# Patient Record
Sex: MALE | Race: WHITE | NOT HISPANIC OR LATINO | Employment: FULL TIME | URBAN - METROPOLITAN AREA
[De-identification: names, ages, dates, MRNs, and addresses within clinical notes are randomized per-mention and may not be internally consistent; named-entity substitution may affect disease eponyms.]

---
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
## 2023-02-03 ENCOUNTER — OFFICE VISIT (OUTPATIENT)
Dept: MEDICAL GROUP | Facility: MEDICAL CENTER | Age: 18
End: 2023-02-03
Payer: COMMERCIAL

## 2023-02-03 ENCOUNTER — HOSPITAL ENCOUNTER (OUTPATIENT)
Facility: MEDICAL CENTER | Age: 18
End: 2023-02-03
Attending: STUDENT IN AN ORGANIZED HEALTH CARE EDUCATION/TRAINING PROGRAM
Payer: COMMERCIAL

## 2023-02-03 VITALS
DIASTOLIC BLOOD PRESSURE: 64 MMHG | BODY MASS INDEX: 25.74 KG/M2 | SYSTOLIC BLOOD PRESSURE: 110 MMHG | HEART RATE: 92 BPM | HEIGHT: 67 IN | OXYGEN SATURATION: 98 % | TEMPERATURE: 98.6 F | WEIGHT: 164 LBS

## 2023-02-03 DIAGNOSIS — F17.290 VAPING NICOTINE DEPENDENCE, TOBACCO PRODUCT: ICD-10-CM

## 2023-02-03 DIAGNOSIS — Z00.00 HEALTH CARE MAINTENANCE: ICD-10-CM

## 2023-02-03 DIAGNOSIS — J02.9 SORE THROAT: ICD-10-CM

## 2023-02-03 DIAGNOSIS — D69.3 IDIOPATHIC THROMBOCYTOPENIC PURPURA (ITP) (HCC): Chronic | ICD-10-CM

## 2023-02-03 PROBLEM — M25.512 LEFT SHOULDER PAIN: Status: RESOLVED | Noted: 2020-02-19 | Resolved: 2023-02-03

## 2023-02-03 LAB
INT CON NEG: NORMAL
INT CON POS: NORMAL
S PYO AG THROAT QL: NORMAL

## 2023-02-03 PROCEDURE — 87880 STREP A ASSAY W/OPTIC: CPT | Performed by: STUDENT IN AN ORGANIZED HEALTH CARE EDUCATION/TRAINING PROGRAM

## 2023-02-03 PROCEDURE — 87070 CULTURE OTHR SPECIMN AEROBIC: CPT

## 2023-02-03 PROCEDURE — 99204 OFFICE O/P NEW MOD 45 MIN: CPT | Mod: 25 | Performed by: STUDENT IN AN ORGANIZED HEALTH CARE EDUCATION/TRAINING PROGRAM

## 2023-02-03 PROCEDURE — 99406 BEHAV CHNG SMOKING 3-10 MIN: CPT | Mod: 25 | Performed by: STUDENT IN AN ORGANIZED HEALTH CARE EDUCATION/TRAINING PROGRAM

## 2023-02-03 RX ORDER — FLUTICASONE PROPIONATE 50 MCG
1 SPRAY, SUSPENSION (ML) NASAL 2 TIMES DAILY
Qty: 16 G | Refills: 0 | Status: SHIPPED | OUTPATIENT
Start: 2023-02-03 | End: 2023-12-04

## 2023-02-03 RX ORDER — PANTOPRAZOLE SODIUM 40 MG/1
40 TABLET, DELAYED RELEASE ORAL DAILY
Qty: 14 TABLET | Refills: 0 | Status: SHIPPED | OUTPATIENT
Start: 2023-02-03 | End: 2023-02-17

## 2023-02-03 ASSESSMENT — ENCOUNTER SYMPTOMS
FEVER: 0
SHORTNESS OF BREATH: 0
CHILLS: 0
SORE THROAT: 1

## 2023-02-03 NOTE — PROGRESS NOTES
Subjective:     CC:  Diagnoses of Idiopathic thrombocytopenic purpura (ITP) (HCC), Vaping nicotine dependence, tobacco product, Sore throat, and Health care maintenance were pertinent to this visit.    HISTORY OF THE PRESENT ILLNESS: Patient is a 17 y.o. male with the following medical problems   Past Medical History:   Diagnosis Date    ITP (idiopathic thrombocytopenic purpura) 2005     . This pleasant patient is here today to establish care and discuss the following;    Problem   Vaping Nicotine Dependence, Tobacco Product    He started vaping in 2020, and currently vapes everyday     Sore Throat    Patient was diagnosed with strep near the end of December he was given amoxacillin. He reports he did not have any improvement in his symptoms. He denies have any other sick contacts. He is currently has productive clear cough. His cough is worse in the morning after waking up. He has been clearing his throat a lot more recently.      Idiopathic Thrombocytopenic Purpura (Itp) (Hcc)    Patient was diagnosed with ITP as an infant. He also had a platelet transfusion as an infant, he has not required any further platelet transfusion.     Left Shoulder Pain (Resolved)       Current Outpatient Medications Ordered in Epic   Medication Sig Dispense Refill    pantoprazole (PROTONIX) 40 MG Tablet Delayed Response Take 1 Tablet by mouth every day for 14 days. 14 Tablet 0    fluticasone (FLONASE) 50 MCG/ACT nasal spray Administer 1 Spray into affected nostril(S) 2 times a day. 16 g 0     No current Epic-ordered facility-administered medications on file.         ROS:   Review of Systems   Constitutional:  Negative for chills and fever.   HENT:  Positive for congestion and sore throat.    Respiratory:  Negative for shortness of breath.    Cardiovascular:  Negative for chest pain.       Objective:     Exam: /64 (BP Location: Left arm, Patient Position: Sitting, BP Cuff Size: Adult)   Pulse 92   Temp 37 °C (98.6 °F) (Temporal)  "  Ht 1.702 m (5' 7\")   Wt 74.4 kg (164 lb)   SpO2 98%  Body mass index is 25.69 kg/m².    Physical Exam  Constitutional:       General: He is not in acute distress.     Appearance: He is not ill-appearing.   HENT:      Mouth/Throat:      Pharynx: No oropharyngeal exudate or posterior oropharyngeal erythema.   Pulmonary:      Effort: Pulmonary effort is normal.   Neurological:      Mental Status: He is alert.   Psychiatric:         Mood and Affect: Mood normal.         Behavior: Behavior normal.         Thought Content: Thought content normal.         Judgment: Judgment normal.             Assessment & Plan:     Problem List Items Addressed This Visit       Idiopathic thrombocytopenic purpura (ITP) (HCC) (Chronic)    Sore throat     Acute  -I believe patient's sore throat is caused from post nasal drip and GERD   -will try flonase an claritin   -protonix 40mg for possible GERD          Relevant Medications    pantoprazole (PROTONIX) 40 MG Tablet Delayed Response    fluticasone (FLONASE) 50 MCG/ACT nasal spray    Other Relevant Orders    POCT Rapid Strep A    CULTURE THROAT    Vaping nicotine dependence, tobacco product     Chronic  -4 minutes to discuss benefits of quitting vaping          Other Visit Diagnoses       Health care maintenance        Relevant Orders    HEMOGLOBIN A1C    CBC WITH DIFFERENTIAL    Comp Metabolic Panel    TSH WITH REFLEX TO FT4    Lipid Profile                Return in about 4 weeks (around 3/3/2023) for Annual.    Please note that this dictation was created using voice recognition software. I have made every reasonable attempt to correct obvious errors, but I expect that there are errors of grammar and possibly content that I did not discover before finalizing the note.  "

## 2023-02-03 NOTE — ASSESSMENT & PLAN NOTE
Acute  -I believe patient's sore throat is caused from post nasal drip and GERD   -will try flonase an claritin   -protonix 40mg for possible GERD

## 2023-02-06 LAB
BACTERIA SPEC RESP CULT: NORMAL
SIGNIFICANT IND 70042: NORMAL
SITE SITE: NORMAL
SOURCE SOURCE: NORMAL

## 2023-02-07 ENCOUNTER — TELEPHONE (OUTPATIENT)
Dept: MEDICAL GROUP | Facility: MEDICAL CENTER | Age: 18
End: 2023-02-07
Payer: COMMERCIAL

## 2023-02-07 NOTE — TELEPHONE ENCOUNTER
----- Message from Young Gandara D.O. sent at 2/6/2023  7:56 AM PST -----  Please let patient know the throat culture was negative.

## 2023-10-12 ENCOUNTER — HOSPITAL ENCOUNTER (EMERGENCY)
Facility: MEDICAL CENTER | Age: 18
End: 2023-10-12
Attending: EMERGENCY MEDICINE
Payer: COMMERCIAL

## 2023-10-12 ENCOUNTER — APPOINTMENT (OUTPATIENT)
Dept: RADIOLOGY | Facility: MEDICAL CENTER | Age: 18
End: 2023-10-12
Attending: EMERGENCY MEDICINE
Payer: COMMERCIAL

## 2023-10-12 VITALS
DIASTOLIC BLOOD PRESSURE: 69 MMHG | RESPIRATION RATE: 16 BRPM | HEIGHT: 67 IN | WEIGHT: 199.74 LBS | OXYGEN SATURATION: 98 % | SYSTOLIC BLOOD PRESSURE: 148 MMHG | TEMPERATURE: 98.8 F | HEART RATE: 80 BPM | BODY MASS INDEX: 31.35 KG/M2

## 2023-10-12 DIAGNOSIS — M25.512 ACUTE PAIN OF LEFT SHOULDER: ICD-10-CM

## 2023-10-12 PROCEDURE — 73030 X-RAY EXAM OF SHOULDER: CPT | Mod: LT

## 2023-10-12 PROCEDURE — 99283 EMERGENCY DEPT VISIT LOW MDM: CPT

## 2023-10-12 NOTE — ED NOTES
Universal Shoulder Immobilizer applied to patient left should. Education given on how to apply immobilizer. Patient acknowledged education. CMS checked before and after immobilizer applied. CMS intact.

## 2023-10-12 NOTE — ED NOTES
Discharge education provided. Discharge paperwork signed by pt. All questions answered. All belongings with pt. Pt ambulated to lobby unassisted with steady gait.

## 2023-10-12 NOTE — ED PROVIDER NOTES
ED Provider Note    Primary care provider: Young Gandara D.O.  Means of arrival: private vehicle  History obtained from: patient  History limited by: none    CHIEF COMPLAINT  Chief Complaint   Patient presents with    Shoulder Pain     H/o bilateral shoulder repair, left shoulder has been popping out frequently, shoulder came out this morning and friend placed it back in and now having numbess to left hand with pulling into  left pectoral muscle.         HPI/ROS  Samir Carrillo is a 18 y.o. male who presents to the Emergency Department for evaluation of shoulder pain.  Patient reports that he has chronic bilateral shoulder instability.  He has had procedures done on both shoulders in 2020.  Has not followed up with orthopedics.  Over the last year he has had worsening instability in his left shoulder and reports that he subluxed/dislocates it at least 4 times a month.  He could not recall who his orthopedic surgeon was and therefore has not followed up.  This morning his shoulder was dislocated and he reports he was able to pop it back in place.  He has had paresthesias in his left arm since then but denies complete numbness or weakness.  He reports that his pain is tolerable in his left shoulder.  He describes it as a pulling pain into his left pectoral muscle.    EXTERNAL RECORDS REVIEWED  Patient last had orthopedic surgery on the left shoulder with Dr. Carvalho 2020.    LIMITATION TO HISTORY   none    OUTSIDE HISTORIAN(S):  none      PAST MEDICAL HISTORY   has a past medical history of ITP (idiopathic thrombocytopenic purpura) (2005).    SURGICAL HISTORY   has a past surgical history that includes shoulder arthroscopy (Left, 2/19/2020); shoulder repair bankhart (Left, 2/19/2020); shoulder arthroscopy (Right, 11/30/2020); and shoulder repair bankhart (Right, 11/30/2020).    SOCIAL HISTORY  Social History     Tobacco Use    Smoking status: Never    Smokeless tobacco: Never   Vaping Use    Vaping Use:  "Every day    Substances: Nicotine   Substance Use Topics    Alcohol use: Never    Drug use: Never      Social History     Substance and Sexual Activity   Drug Use Never       FAMILY HISTORY  Family History   Problem Relation Age of Onset    Hypertension Brother        CURRENT MEDICATIONS  Home Medications       Reviewed by Vanesa Rosas R.N. (Registered Nurse) on 10/12/23 at 0946  Med List Status: Not Addressed     Medication Last Dose Status   fluticasone (FLONASE) 50 MCG/ACT nasal spray  Active                    ALLERGIES  Allergies   Allergen Reactions    Percocet [Kdc:Acetaminophen+Oxycodone+Tartrazine]        PHYSICAL EXAM  VITAL SIGNS: BP (!) 156/71   Pulse 78   Temp 36.8 °C (98.2 °F) (Temporal)   Resp 16   Ht 1.702 m (5' 7\")   Wt 90.6 kg (199 lb 11.8 oz)   SpO2 97%   BMI 31.28 kg/m²   Vitals reviewed by myself.  Physical Exam  Nursing note and vitals reviewed.  Constitutional: Well-developed and well-nourished.  Appears uncomfortable  HENT: Head is normocephalic and atraumatic.  Eyes: extra-ocular movements intact  Cardiovascular: regular rate and  regular rhythm. 2+ bilateral radial pulses  Pulmonary/Chest: Normal respiratory effort  Musculoskeletal: Patient has 5 out of 5  strength in bilateral upper extremities.  Normal range of motion of bilateral elbows, patient is unable to abduct his left shoulder or flex his left shoulder past 90 degrees  Neurological: Awake and alert, sensation intact to median, ulnar, radial and axillary nerve distributions bilaterally  Skin: Skin is warm and dry. No rash.         DIAGNOSTIC STUDIES:      RADIOLOGY  Images independently interpreted by myself prior to radiologist review:  -No acute bony fracture    Final interpretation by radiology demonstrates:    DX-SHOULDER 2+ LEFT   Final Result      No acute osseous abnormality.        The radiologist's interpretation of all radiological studies have been reviewed by me.      COURSE & MEDICAL DECISION " MAKING    ED Observation Status? No; Patient does not meet criteria for ED Observation.     INITIAL ASSESSMENT AND PLAN    Patient is a 18-year-old male who comes in for evaluation of left shoulder pain and paresthesias.  Differential diagnosis includes radiculopathy, loose ligaments, subluxated shoulder, fracture, dislocation.  Diagnostic work-up includes left shoulder x-ray.     ER COURSE AND FINAL DISPOSITION   Patient's initial vitals notable for slight hypertension likely related to discomfort however he is neurovascularly intact on exam.  Patient is offered pain medication but declines at this time.  X-ray returns and demonstrates no acute osseous abnormalities.  Given patient's shoulder instability I will place him in a shoulder immobilizer and placed orthopedics referral for follow-up as he may require repeat procedure.  Patient is amenable to this plan.  He is then given strict return precautions and discharged in stable condition.    ADDITIONAL PROBLEM LIST AND RESOURCE UTILIZATION    Additional problems aside from the chief complaint that I have addressed: none    I have discussed management of the patient with the following physicians and RONEL's: none    Discussion of management with other QHP or appropriate source(s): none     Escalation of care considered, and ultimately not performed: see above.     Barriers to care at this time, including but not limited to: none.     Decision tools and prescription drugs considered including, but not limited to: see above.    Please see review of records as noted above      FINAL IMPRESSION  1. Acute pain of left shoulder

## 2023-10-12 NOTE — ED TRIAGE NOTES
Pt ambulated to triage with   Chief Complaint   Patient presents with    Shoulder Pain     H/o bilateral shoulder repair, left shoulder has been popping out frequently, shoulder came out this morning and friend placed it back in and now having numbess to left hand with pulling into  left pectoral muscle.        Pt Informed regarding triage process and verbalized understanding to inform triage tech or RN for any changes in condition. Placed in lobby.

## 2023-11-24 ENCOUNTER — HOSPITAL ENCOUNTER (EMERGENCY)
Facility: MEDICAL CENTER | Age: 18
End: 2023-11-24
Attending: EMERGENCY MEDICINE
Payer: COMMERCIAL

## 2023-11-24 VITALS
SYSTOLIC BLOOD PRESSURE: 121 MMHG | DIASTOLIC BLOOD PRESSURE: 75 MMHG | TEMPERATURE: 97.3 F | BODY MASS INDEX: 30.29 KG/M2 | WEIGHT: 188.49 LBS | OXYGEN SATURATION: 95 % | RESPIRATION RATE: 20 BRPM | HEART RATE: 75 BPM | HEIGHT: 66 IN

## 2023-11-24 DIAGNOSIS — M25.512 ACUTE PAIN OF LEFT SHOULDER: ICD-10-CM

## 2023-11-24 PROCEDURE — 700111 HCHG RX REV CODE 636 W/ 250 OVERRIDE (IP): Mod: JZ | Performed by: EMERGENCY MEDICINE

## 2023-11-24 PROCEDURE — 99283 EMERGENCY DEPT VISIT LOW MDM: CPT

## 2023-11-24 PROCEDURE — 96372 THER/PROPH/DIAG INJ SC/IM: CPT

## 2023-11-24 RX ORDER — HALOPERIDOL 5 MG/ML
2.5 INJECTION INTRAMUSCULAR ONCE
Status: COMPLETED | OUTPATIENT
Start: 2023-11-24 | End: 2023-11-24

## 2023-11-24 RX ORDER — MORPHINE SULFATE 4 MG/ML
4 INJECTION INTRAVENOUS ONCE
Status: COMPLETED | OUTPATIENT
Start: 2023-11-24 | End: 2023-11-24

## 2023-11-24 RX ADMIN — MORPHINE SULFATE 4 MG: 4 INJECTION, SOLUTION INTRAMUSCULAR; INTRAVENOUS at 19:53

## 2023-11-24 RX ADMIN — HALOPERIDOL LACTATE 2.5 MG: 5 INJECTION, SOLUTION INTRAMUSCULAR at 20:56

## 2023-11-24 ASSESSMENT — PAIN DESCRIPTION - PAIN TYPE: TYPE: ACUTE PAIN

## 2023-11-25 ENCOUNTER — OFFICE VISIT (OUTPATIENT)
Dept: URGENT CARE | Facility: PHYSICIAN GROUP | Age: 18
End: 2023-11-25
Payer: COMMERCIAL

## 2023-11-25 VITALS
SYSTOLIC BLOOD PRESSURE: 110 MMHG | BODY MASS INDEX: 29.51 KG/M2 | OXYGEN SATURATION: 97 % | TEMPERATURE: 97.8 F | HEIGHT: 67 IN | WEIGHT: 188 LBS | RESPIRATION RATE: 16 BRPM | DIASTOLIC BLOOD PRESSURE: 72 MMHG | HEART RATE: 74 BPM

## 2023-11-25 DIAGNOSIS — S46.912A SHOULDER STRAIN, LEFT, INITIAL ENCOUNTER: ICD-10-CM

## 2023-11-25 DIAGNOSIS — G47.01 INSOMNIA DUE TO MEDICAL CONDITION: ICD-10-CM

## 2023-11-25 PROCEDURE — 3074F SYST BP LT 130 MM HG: CPT | Performed by: NURSE PRACTITIONER

## 2023-11-25 PROCEDURE — 3078F DIAST BP <80 MM HG: CPT | Performed by: NURSE PRACTITIONER

## 2023-11-25 PROCEDURE — 99214 OFFICE O/P EST MOD 30 MIN: CPT | Performed by: NURSE PRACTITIONER

## 2023-11-25 RX ORDER — TIZANIDINE 4 MG/1
4 TABLET ORAL EVERY 6 HOURS PRN
Qty: 30 TABLET | Refills: 0 | Status: SHIPPED | OUTPATIENT
Start: 2023-11-25 | End: 2023-12-02

## 2023-11-25 RX ORDER — ACETAMINOPHEN 120 MG/1
120 SUPPOSITORY RECTAL EVERY 4 HOURS PRN
COMMUNITY

## 2023-11-25 RX ORDER — TRAMADOL HYDROCHLORIDE 50 MG/1
50 TABLET ORAL EVERY 4 HOURS PRN
Qty: 35 TABLET | Refills: 0 | Status: SHIPPED | OUTPATIENT
Start: 2023-11-25 | End: 2023-12-02

## 2023-11-25 RX ORDER — TRAMADOL HYDROCHLORIDE 50 MG/1
50 TABLET ORAL EVERY 4 HOURS PRN
Qty: 42 TABLET | Refills: 0 | Status: SHIPPED | OUTPATIENT
Start: 2023-11-25 | End: 2023-11-25 | Stop reason: SDUPTHER

## 2023-11-25 NOTE — ED TRIAGE NOTES
"Samir Carrillo  18 y.o.  Male  Chief Complaint   Patient presents with    Shoulder Pain     Pt reports worsening L shoulder pain x 24hrs. Pt reports hx of bilat shoulder surgery approx 3 years ago w placement of pins/screws. Pt reports MRI 11/20 of shoulders showed pins/screws are no longer in the correct place, pt reports he has an appointment w [facility] 12/04 for revision.        Pt to triage for above compliant.     Pt placed in lobby and educated on triage process. Pt encouraged to alert staff for any changes, pt verbalized understanding.     /76   Pulse 100   Temp 36.3 °C (97.3 °F) (Temporal)   Resp 16   Ht 1.676 m (5' 6\")   Wt 85.5 kg (188 lb 7.9 oz)   SpO2 98%   BMI 30.42 kg/m²     "

## 2023-11-25 NOTE — LETTER
November 25, 2023    To Whom It May Concern:         This is confirmation that Samir Rdz Lorena attended his scheduled appointment with ALEX Anderson on 11/25/23.  Please excuse him from work on the dates of 11/24-11/25 due to an acute medical condition.         If you have any questions please do not hesitate to call me at the phone number listed below.    Sincerely,          RUFINO AndersonRRobiN.  [phone]

## 2023-11-25 NOTE — ED PROVIDER NOTES
ED Provider Note    CHIEF COMPLAINT  Chief Complaint   Patient presents with    Shoulder Pain     Pt reports worsening L shoulder pain x 24hrs. Pt reports hx of bilat shoulder surgery approx 3 years ago w placement of pins/screws. Pt reports MRI 11/20 of shoulders showed pins/screws are no longer in the correct place, pt reports he has an appointment w [facility] 12/04 for revision.         HPI    Primary care provider: Young Gandara D.O.   History obtained from: Patient and family  History limited by: None     Samir Carrillo is a 18 y.o. male who presents to the ED complaining of worsening left shoulder pain for 24 hours.  Patient has had bilateral shoulder surgery about 3 years ago and recently his left shoulder pain has became worse.  He was seen by orthopedics and had MRI performed on November 20 and reports that it shows the pins and screws are no longer in the correct place.  Patient has appointment with orthopedics on December 4 to discuss possible revision surgery.  However, his pain became worse despite using home medication which prompted him to come to the ED.  No fever.  He reports numbness in his left ring and pinky fingers.    REVIEW OF SYSTEMS  Please see HPI for pertinent positives/negatives.  All other systems reviewed and are negative.     PAST MEDICAL HISTORY  Past Medical History:   Diagnosis Date    ITP (idiopathic thrombocytopenic purpura) 2005        SURGICAL HISTORY  Past Surgical History:   Procedure Laterality Date    SHOULDER ARTHROSCOPY Right 11/30/2020    Procedure: ARTHROSCOPY, SHOULDER - DEBRIDEMENT;  Surgeon: Jaison Villa M.D.;  Location: [facility];  Service: Orthopedics    SHOULDER REPAIR BANKHART Right 11/30/2020    Procedure: REPAIR, SHOULDER, BANKART;  Surgeon: Jaison Villa M.D.;  Location: [facility];  Service: Orthopedics    SHOULDER ARTHROSCOPY Left 2/19/2020    Procedure: ARTHROSCOPY,  "SHOULDER-anterior labral repair and removal of loose body.;  Surgeon: Rafa Schofield M.D.;  Location: SURGERY [facility];  Service: Orthopedics    SHOULDER REPAIR BANKHART Left 2/19/2020    Procedure: REPAIR, SHOULDER, BANKART;  Surgeon: Rafa Schofield M.D.;  Location: SURGERY [facility];  Service: Orthopedics        SOCIAL HISTORY  Social History     Tobacco Use    Smoking status: Never    Smokeless tobacco: Never   Vaping Use    Vaping Use: Every day    Substances: Nicotine   Substance and Sexual Activity    Alcohol use: Never    Drug use: Never    Sexual activity: Not on file        FAMILY HISTORY  Family History   Problem Relation Age of Onset    Hypertension Brother         CURRENT MEDICATIONS  Home Medications       Reviewed by Fernanda Carbajal R.N. (Registered Nurse) on 11/24/23 at 1926  Med List Status: Not Addressed     Medication Last Dose Status   fluticasone (FLONASE) 50 MCG/ACT nasal spray  Active                     ALLERGIES  Allergies   Allergen Reactions    Percocet [Kdc:Acetaminophen+Oxycodone+Tartrazine]         PHYSICAL EXAM  VITAL SIGNS: /75   Pulse 75   Temp 36.3 °C (97.3 °F) (Temporal)   Resp 20   Ht 1.676 m (5' 6\")   Wt 85.5 kg (188 lb 7.9 oz)   SpO2 95%   BMI 30.42 kg/m²  @BURAK[[id number]::@     Pulse ox interpretation: 98% I interpret this pulse ox as normal     Constitutional: Well developed, well nourished, alert in no apparent distress, nontoxic appearance    HENT: No external signs of trauma, normocephalic  Eyes: PERRL, conjunctiva without erythema, no discharge, no icterus    Thorax & Lungs: No respiratory distress  Extremities: No cyanosis, diffuse tenderness to palpation left shoulder with limited range of motion due to pain, no edema, no gross deformity, intact distal pulses with brisk cap refill, sensation intact to touch throughout with distal 5/5 strength  Skin: Warm, dry, no pallor/cyanosis, no rash noted      Neuro: A/O times 3, no " focal deficits noted    Psychiatric: Cooperative      DIAGNOSTIC STUDIES / PROCEDURES        LABS  All labs reviewed by me.     Results for orders placed or performed during the hospital encounter of 02/03/23   CULTURE THROAT    Specimen: Throat   Result Value Ref Range    Significant Indicator NEG     Source THRT     Site -     Culture Result       Moderate growth usual upper respiratory mony  No group A beta Streptococcus isolated.          RADIOLOGY  I have independently interpreted the diagnostic imaging associated with this visit and am waiting the final reading from the radiologist.     No orders to display          COURSE & MEDICAL DECISION MAKING  Nursing notes, VS, PMSFHx reviewed in chart.     Review of past medical records shows the patient was last seen in this ED October 12, 2023 for left shoulder pain.  Patient was seen by orthopedics in the office on November 6, 2023 for left shoulder pain and instability.  MRI was ordered and performed on November 20, 2023.      Differential diagnoses considered include but are not limited to: Subluxation, strain, sprain, bursitis, tendonitis, neuropathy, radiculopathy      ED Observation Status? No; Patient does not meet criteria for ED Observation.       Discussion of management with other QHP or appropriate source(s): None     Escalation of care considered, and ultimately not performed: diagnostic imaging.     Decision tools and prescription drugs considered including, but not limited to: Pain Medications   .        History and physical exam as above.  This is a 18-year-old male patient with history of ITP and previous surgeries on both shoulders with chronic shoulder pain who presents to the ED complaining of worsening left shoulder pain without recent trauma.  He was seen by orthopedics in the office on November 6 for his left shoulder pain and MRI was performed on November 20.  He reports having upcoming appointment to discuss possible revision surgery.  I  discussed with patient and family x-rays and he declined which I think is reasonable since he has no recent trauma.  He was treated with IM morphine initially without much improvement and subsequently low-dose IM Haldol with improvement of his pain.  I was later informed by ED nurse that patient and family left the ED without notifying staff.      The patient is referred to a primary physician for blood pressure management, diabetic screening, and for all other preventative health concerns.       FINAL IMPRESSION  1. Acute pain of left shoulder Acute          DISPOSITION  Patient eloped from the ED.      FOLLOW UP  No follow-up provider specified.       OUTPATIENT MEDICATIONS  Discharge Medication List as of 11/24/2023 11:25 PM             Electronically signed by: Abdelrahman Hancock D.O., 11/24/2023 7:39 PM      Portions of this record were made with voice recognition software.  Despite my review, errors may remain.  Please interpret this chart in the appropriate context.

## 2023-11-25 NOTE — ED NOTES
This RN to inform pt that I informed there ERP his wish to leave. Pt no longer in there room when this RN got there.

## 2023-11-25 NOTE — PROGRESS NOTES
"Subjective:   Samir Carrillo is a 18 y.o. male who presents for Shoulder Pain (Left shoulder ,pulled out screws .extreme pain . )       HPI  Patient is a pleasant 18 y.o. who presents for evaluation of acute on chronic left shoulder pain.  Patient is status post left shoulder arthroscopy with pinning/anchoring, however states that he has had several episodes where his left shoulder spontaneously became dislocated, even with the slightest of motion such as reaching behind him to prop himself up.  Patient recently had an MRI which shows loosening of prior surgical anchoring.  He has a follow-up with orthopedics on 12/4.  Patient will often use a sling while at work in a public to prevent intermittent dislocation.  Has tried over-the-counter discharged with orthopedics.    ROS  All other systems are negative except as documented above within [facility].    MEDS:   Current Outpatient Medications:     acetaminophen (TYLENOL) 120 MG Suppos, Insert 120 mg into the rectum every four hours as needed for Moderate Pain., Disp: , Rfl:     fluticasone (FLONASE) 50 MCG/ACT nasal spray, Administer 1 Spray into affected nostril(S) 2 times a day. (Patient not taking: Reported on 11/25/2023), Disp: 16 g, Rfl: 0  ALLERGIES:   Allergies   Allergen Reactions    Percocet [Kdc:Acetaminophen+Oxycodone+Tartrazine]        Patient's PMH, SocHx, SurgHx, FamHx, Drug allergies and medications were reviewed.     Objective:   /72   Pulse 74   Temp 36.6 °C (97.8 °F) (Temporal)   Resp 16   Ht 1.702 m (5' 7\")   Wt 85.3 kg (188 lb)   SpO2 97%   BMI 29.44 kg/m²     Physical Exam  Vitals and nursing note reviewed.   Constitutional:       General: He is awake.      Appearance: Normal appearance. He is well-developed.   HENT:      Head: Normocephalic and atraumatic.      Right Ear: External ear normal.      Left Ear: External ear normal.      Nose: Nose normal.      Mouth/Throat:      Lips: Pink.      Mouth: Mucous membranes are moist.      " Pharynx: Oropharynx is clear.   Eyes:      General: Lids are normal.      Extraocular Movements: Extraocular movements intact.      Conjunctiva/sclera: Conjunctivae normal.   Cardiovascular:      Rate and Rhythm: Normal rate and regular rhythm.   Pulmonary:      Effort: Pulmonary effort is normal.   Musculoskeletal:      Left shoulder: Tenderness and bony tenderness present. Decreased range of motion.        Arms:       Cervical back: Normal range of motion.      Comments: Wearing sling, decreased range of motion secondary to pain with abduction, adduction, lateral extension.  Special testing not performed secondary to pain.   Skin:     General: Skin is warm and dry.   Neurological:      Mental Status: He is alert and oriented to person, place, and time.   Psychiatric:         Mood and Affect: Mood normal.         Behavior: Behavior normal. Behavior is cooperative.         Thought Content: Thought content normal.         Judgment: Judgment normal.         Assessment/Plan:   Assessment    1. Shoulder strain, left, initial encounter  - tizanidine (ZANAFLEX) 4 MG Tab; Take 1 Tablet by mouth every 6 hours as needed (muscle spams/pain).  Dispense: 30 Tablet; Refill: 0  - traMADol (ULTRAM) 50 MG Tab; Take 1 Tablet by mouth every four hours as needed for Moderate Pain for up to 7 days.  Dispense: 35 Tablet; Refill: 0    2. Insomnia due to medical condition      Vital signs stable at today's acute urgent care visit.  Begin medications as listed, use sparingly.  Recommend topical anti-inflammatory creams.  Recommend range of motion exercises as tolerated to prevent possibility of frozen shoulder.  Use the sling sparingly.  Patient has follow-up with orthopedics on 12/4.    Advised the patient to follow-up with the primary care provider if symptoms persist.  Red flags discussed and indications to immediately call 911 or present to the ED. All questions were encouraged and answered to the patient's satisfaction and  understanding, and they agree to the plan of care.     This is an acute problem with uncertain prognosis, medication management and instructions as well as management options were provided.  I personally reviewed prior external notes and test results pertinent to today and independently reviewed and interpreted all diagnostics, to include POCT testing. Time spent evaluating this patient includes preparing for visit, counseling/education, exam, evaluation, obtaining history, and ordering lab/test/procedures.      Please note that this dictation was created using voice recognition software. I have made a reasonable attempt to correct obvious errors, but I expect that there are errors of grammar and possibly content that I did not discover before finalizing the note.

## 2023-12-02 DIAGNOSIS — S46.912A SHOULDER STRAIN, LEFT, INITIAL ENCOUNTER: ICD-10-CM

## 2023-12-02 RX ORDER — TIZANIDINE 4 MG/1
4 TABLET ORAL EVERY 6 HOURS PRN
Qty: 60 TABLET | Refills: 1 | Status: SHIPPED | OUTPATIENT
Start: 2023-12-02 | End: 2023-12-11 | Stop reason: SDUPTHER

## 2023-12-04 PROBLEM — S43.432A GLENOID LABRUM TEAR, LEFT, INITIAL ENCOUNTER: Status: ACTIVE | Noted: 2023-12-04

## 2023-12-04 PROBLEM — S43.432A SUPERIOR LABRUM ANTERIOR-TO-POSTERIOR (SLAP) TEAR OF LEFT SHOULDER: Status: ACTIVE | Noted: 2023-12-04

## 2023-12-04 PROBLEM — S43.015A ANTERIOR DISLOCATION OF LEFT SHOULDER: Status: ACTIVE | Noted: 2023-12-04

## 2023-12-11 DIAGNOSIS — S46.912A SHOULDER STRAIN, LEFT, INITIAL ENCOUNTER: ICD-10-CM

## 2023-12-11 RX ORDER — TIZANIDINE 4 MG/1
4 TABLET ORAL EVERY 6 HOURS PRN
Qty: 60 TABLET | Refills: 1 | Status: SHIPPED | OUTPATIENT
Start: 2023-12-11

## 2023-12-11 RX ORDER — TRAMADOL HYDROCHLORIDE 50 MG/1
50 TABLET ORAL EVERY 6 HOURS PRN
Qty: 56 TABLET | Refills: 0 | Status: SHIPPED | OUTPATIENT
Start: 2023-12-11 | End: 2023-12-25

## (undated) DEVICE — SUTURE 4-0 ETHILON FS-2 18 (36PK/BX)"

## (undated) DEVICE — PACK SHOULDER ARTHROSCOPY SM - (2EA/CA)

## (undated) DEVICE — MASK ANESTHESIA ADULT  - (100/CA)

## (undated) DEVICE — CHLORAPREP 26 ML APPLICATOR - ORANGE TINT(25/CA)

## (undated) DEVICE — SODIUM CHL IRRIGATION 0.9% 1000ML (12EA/CA)

## (undated) DEVICE — DRAPE LARGE 3 QUARTER - (20/CA)

## (undated) DEVICE — TUBING PATIENT W/CONNECTOR REDEUCE (1EA)

## (undated) DEVICE — ABLATOR WAND SERFAS 90-S CRUISE

## (undated) DEVICE — BAG, SPONGE COUNT 50600

## (undated) DEVICE — SUCTION INSTRUMENT YANKAUER BULBOUS TIP W/O VENT (50EA/CA)

## (undated) DEVICE — TUBING PUMP WITH CONNECTOR REDEUCE (1EA)

## (undated) DEVICE — WATER IRRIGATION STERILE 1000ML (12EA/CA)

## (undated) DEVICE — SHAVER4.0 AGGRESSIVE + FORMLA (5EA/BX)

## (undated) DEVICE — KIT ROOM DECONTAMINATION

## (undated) DEVICE — GLOVE BIOGEL SZ 8 SURGICAL PF LTX - (50PR/BX 4BX/CA)

## (undated) DEVICE — CANNULA FULLY THREADED 8 X 75 (5EA/BX)

## (undated) DEVICE — ELECTRODE 850 FOAM ADHESIVE - HYDROGEL RADIOTRNSPRNT (50/PK)

## (undated) DEVICE — GLOVE BIOGEL INDICATOR SZ 8.5 SURGICAL PF LTX - (50/BX 4BX/CA)

## (undated) DEVICE — GLOVE, LITE (PAIR)

## (undated) DEVICE — TUBING CLEARLINK DUO-VENT - C-FLO (48EA/CA)

## (undated) DEVICE — SLEEVE SHOULDER DISP(ARTHREX) - (6/BX)

## (undated) DEVICE — TUBE CONNECTING SUCTION - CLEAR PLASTIC STERILE 72 IN (50EA/CA)

## (undated) DEVICE — DRAPE U ORTHOPEDIC - (10/BX)

## (undated) DEVICE — CANISTER SUCTION RIGID RED 1500CC (40EA/CA)

## (undated) DEVICE — SENSOR SPO2 NEO LNCS ADHESIVE (20/BX) SEE USER NOTES

## (undated) DEVICE — KIT ANESTHESIA W/CIRCUIT & 3/LT BAG W/FILTER (20EA/CA)

## (undated) DEVICE — DRAPE SHOULDER FLUID CONTROL - 77 X 85 (10/CA)

## (undated) DEVICE — GLOVE BIOGEL PI INDICATOR SZ 6.5 SURGICAL PF LF - (50/BX 4BX/CA)

## (undated) DEVICE — CANNULA THREADED 5X75 (5EA/BX)

## (undated) DEVICE — LACTATED RINGERS INJ 1000 ML - (14EA/CA 60CA/PF)

## (undated) DEVICE — PROBE VULCAN 90 DEG W/SUCTION

## (undated) DEVICE — DRAPE STRLE REG TOWEL 18X24 - (10/BX 4BX/CA)"

## (undated) DEVICE — HUMID-VENT HEAT AND MOISTURE EXCHANGE- (50/BX)

## (undated) DEVICE — PROTECTOR ULNA NERVE - (36PR/CA)

## (undated) DEVICE — BAG SPONGE COUNT 10.25 X 32 - BLUE (250/CA)

## (undated) DEVICE — PAD PREP 24 X 48 CUFFED - (100/CA)

## (undated) DEVICE — SODIUM CHL. IRRIGATION 0.9% 3000ML (4EA/CA 65CA/PF)

## (undated) DEVICE — TUBE E-T HI-LO CUFF 7.5MM (10EA/PK)

## (undated) DEVICE — SUTURE 3-0 ETHILON FS-1 - (36/BX) 30 INCH

## (undated) DEVICE — GOWN SURGEONS X-LARGE - DISP. (30/CA)

## (undated) DEVICE — Device

## (undated) DEVICE — GLOVE BIOGEL SZ 6 PF LATEX - (50EA/BX 4BX/CA)

## (undated) DEVICE — GLOVE SURGICAL PROTEXIS PI 8.0 LF - (50PR/BX)

## (undated) DEVICE — GLOVE BIOGEL PI INDICATOR SZ 8.5 SURGICAL PF LF - (50PR/BX 4BX/CA)

## (undated) DEVICE — GLOVE BIOGEL PI ULTRATOUCH SZ 7.0 SURGICAL PF LF- POWDER FREE (50/BX 4BX/CA)

## (undated) DEVICE — SHAVER 5.5 RESECTOR FORMULA (5EA/BX )

## (undated) DEVICE — HEAD HOLDER JUNIOR/ADULT

## (undated) DEVICE — NEPTUNE 4 PORT MANIFOLD - (20/PK)

## (undated) DEVICE — GLOVE BIOGEL ECLIPSE PF LATEX SIZE 8.0  (50PR/BX)

## (undated) DEVICE — SUTURE GENERAL

## (undated) DEVICE — GOWN WARMING STANDARD FLEX - (30/CA)

## (undated) DEVICE — DRAPETIBURON SHOULDER W/POUCH - (5EA/CA)

## (undated) DEVICE — ELECTRODE DUAL RETURN W/ CORD - (50/PK)

## (undated) DEVICE — PACK MAJOR ORTHO - (2EA/CA)

## (undated) DEVICE — DRESSING 3X8 ADAPTIC GAUZE - NON-ADHERING (36/PK 6PK/BX)

## (undated) DEVICE — DRESSING 3X3 ADAPTIC GAUZE - (50EA/CT)

## (undated) DEVICE — NEEDLE MULTIFIRE (5EA/BX)

## (undated) DEVICE — SET EXTENSION WITH 2 PORTS (48EA/CA) ***PART #2C8610 IS A SUBSTITUTE*****

## (undated) DEVICE — SHAVER4.0 RESECTOR FORMULA - (5EA/BX)

## (undated) DEVICE — TUBE CONNECT SUCTION CLEAR 120 X 1/4" (50EA/CA)"